# Patient Record
Sex: MALE | Race: WHITE | NOT HISPANIC OR LATINO
[De-identification: names, ages, dates, MRNs, and addresses within clinical notes are randomized per-mention and may not be internally consistent; named-entity substitution may affect disease eponyms.]

---

## 2021-08-12 PROBLEM — Z00.00 ENCOUNTER FOR PREVENTIVE HEALTH EXAMINATION: Status: ACTIVE | Noted: 2021-08-12

## 2021-09-01 ENCOUNTER — TRANSCRIPTION ENCOUNTER (OUTPATIENT)
Age: 61
End: 2021-09-01

## 2021-09-27 ENCOUNTER — NON-APPOINTMENT (OUTPATIENT)
Age: 61
End: 2021-09-27

## 2021-10-05 ENCOUNTER — APPOINTMENT (OUTPATIENT)
Dept: SURGERY | Facility: CLINIC | Age: 61
End: 2021-10-05
Payer: COMMERCIAL

## 2021-10-05 VITALS
HEART RATE: 71 BPM | DIASTOLIC BLOOD PRESSURE: 84 MMHG | WEIGHT: 180 LBS | SYSTOLIC BLOOD PRESSURE: 174 MMHG | HEIGHT: 68.5 IN | BODY MASS INDEX: 26.97 KG/M2

## 2021-10-05 DIAGNOSIS — Z86.79 PERSONAL HISTORY OF OTHER DISEASES OF THE CIRCULATORY SYSTEM: ICD-10-CM

## 2021-10-05 DIAGNOSIS — Z80.42 FAMILY HISTORY OF MALIGNANT NEOPLASM OF PROSTATE: ICD-10-CM

## 2021-10-05 DIAGNOSIS — Z86.39 PERSONAL HISTORY OF OTHER ENDOCRINE, NUTRITIONAL AND METABOLIC DISEASE: ICD-10-CM

## 2021-10-05 DIAGNOSIS — R59.9 ENLARGED LYMPH NODES, UNSPECIFIED: ICD-10-CM

## 2021-10-05 DIAGNOSIS — Z87.891 PERSONAL HISTORY OF NICOTINE DEPENDENCE: ICD-10-CM

## 2021-10-05 DIAGNOSIS — Z78.9 OTHER SPECIFIED HEALTH STATUS: ICD-10-CM

## 2021-10-05 PROCEDURE — 99204 OFFICE O/P NEW MOD 45 MIN: CPT

## 2021-10-05 RX ORDER — AMLODIPINE BESYLATE 5 MG/1
5 TABLET ORAL
Refills: 0 | Status: ACTIVE | COMMUNITY

## 2021-10-05 RX ORDER — ROSUVASTATIN CALCIUM 5 MG/1
5 TABLET, FILM COATED ORAL
Refills: 0 | Status: ACTIVE | COMMUNITY

## 2021-10-06 NOTE — PHYSICAL EXAM
[de-identified] : 1.2 cm left thyroid nodule, well circumscribed and mobile [Laryngoscopy Performed] : laryngoscopy was performed, see procedure section for findings [Midline] : located in midline position [Normal] : orientation to person, place, and time: normal [de-identified] : indirect  laryngoscopy shows normal vocal cord mobility bilaterally with no lesions noted

## 2021-10-06 NOTE — HISTORY OF PRESENT ILLNESS
[de-identified] : Pt c/o Thyroid nodule found on calcium score CT.  denies dysphagia, hoarseness, SOB or RT exposure\par sonogram:  left 1.6 cm thyroid nodule, FNA (CBL) suspicious for papillary carcinoma;   Left level 4 Lymph node  2.4 cm FNA (CBL) Benign and 1.0 cm adjacent lymph node\par normal TSH\par I have reviewed all old and new data and available images.  Additional information was obtained from others present at the time of visit to ensure the completeness of the history\par

## 2021-10-06 NOTE — CONSULT LETTER
[Dear  ___] : Dear  [unfilled], [Consult Letter:] : I had the pleasure of evaluating your patient, [unfilled]. [Please see my note below.] : Please see my note below. [Consult Closing:] : Thank you very much for allowing me to participate in the care of this patient.  If you have any questions, please do not hesitate to contact me. [Sincerely,] : Sincerely, [FreeTextEntry2] : Dr. Alexx Saravia, Dr. Jayce Moctezuma [FreeTextEntry3] : Andre Guo MD, FACS\par System Director, Endocrine Surgery\par [facility]\par Assistant Clinical Professor of Surgery\par [facility] [employer] at [facility]  [DrDania  ___] : Dr. DEL TORO

## 2021-10-06 NOTE — ASSESSMENT
[FreeTextEntry1] : lengthy discussion regarding options for management including active surveillance  vs thyroid lobectomy with possible paratracheal node dissection, with or without frozen section vs total thyroidectomy with possible paratracheal node dissection.  risks, benefits and alternatives discussed at length.  I have discussed with the patient the anatomy of the area, the pathophysiology of the disease process and the rationale for surgery.  The attendant risks, possible complications and expected postoperative course have been discussed in detail.  I have given the patient the opportunity to ask questions, and all questions have been answered to the patient's satisfaction, and they wish to proceed with the planned procedure.  to schedule lobectomy, possible total thyroidectomy with paratracheal node dissection pending outcome of requested repeat sonogram guided FNA rounded neck node to r/o malignancy, including specimen to be sent for thyroglobulin.  to call next week for results. \par

## 2021-10-13 ENCOUNTER — RESULT REVIEW (OUTPATIENT)
Age: 61
End: 2021-10-13

## 2021-10-20 ENCOUNTER — RESULT REVIEW (OUTPATIENT)
Age: 61
End: 2021-10-20

## 2021-10-20 ENCOUNTER — OUTPATIENT (OUTPATIENT)
Dept: OUTPATIENT SERVICES | Facility: HOSPITAL | Age: 61
LOS: 1 days | End: 2021-10-20
Payer: COMMERCIAL

## 2021-10-20 ENCOUNTER — APPOINTMENT (OUTPATIENT)
Dept: ULTRASOUND IMAGING | Facility: IMAGING CENTER | Age: 61
End: 2021-10-20
Payer: COMMERCIAL

## 2021-10-20 DIAGNOSIS — R59.9 ENLARGED LYMPH NODES, UNSPECIFIED: ICD-10-CM

## 2021-10-20 DIAGNOSIS — Z00.8 ENCOUNTER FOR OTHER GENERAL EXAMINATION: ICD-10-CM

## 2021-10-20 PROCEDURE — 10006 FNA BX W/US GDN EA ADDL: CPT

## 2021-10-20 PROCEDURE — 88173 CYTOPATH EVAL FNA REPORT: CPT | Mod: 26

## 2021-10-20 PROCEDURE — 88305 TISSUE EXAM BY PATHOLOGIST: CPT

## 2021-10-20 PROCEDURE — 10005 FNA BX W/US GDN 1ST LES: CPT

## 2021-10-20 PROCEDURE — 88173 CYTOPATH EVAL FNA REPORT: CPT

## 2021-10-20 PROCEDURE — 88172 CYTP DX EVAL FNA 1ST EA SITE: CPT

## 2021-10-20 PROCEDURE — 88305 TISSUE EXAM BY PATHOLOGIST: CPT | Mod: 26

## 2021-10-21 LAB
NON-GYNECOLOGICAL CYTOLOGY STUDY: SIGNIFICANT CHANGE UP
NON-GYNECOLOGICAL CYTOLOGY STUDY: SIGNIFICANT CHANGE UP

## 2021-10-26 ENCOUNTER — NON-APPOINTMENT (OUTPATIENT)
Age: 61
End: 2021-10-26

## 2021-11-04 ENCOUNTER — OUTPATIENT (OUTPATIENT)
Dept: OUTPATIENT SERVICES | Facility: HOSPITAL | Age: 61
LOS: 1 days | End: 2021-11-04
Payer: COMMERCIAL

## 2021-11-04 VITALS
SYSTOLIC BLOOD PRESSURE: 144 MMHG | RESPIRATION RATE: 18 BRPM | OXYGEN SATURATION: 98 % | DIASTOLIC BLOOD PRESSURE: 90 MMHG | HEIGHT: 68 IN | TEMPERATURE: 98 F | WEIGHT: 182.1 LBS | HEART RATE: 76 BPM

## 2021-11-04 DIAGNOSIS — Z98.890 OTHER SPECIFIED POSTPROCEDURAL STATES: Chronic | ICD-10-CM

## 2021-11-04 DIAGNOSIS — C73 MALIGNANT NEOPLASM OF THYROID GLAND: ICD-10-CM

## 2021-11-04 DIAGNOSIS — E04.1 NONTOXIC SINGLE THYROID NODULE: ICD-10-CM

## 2021-11-04 DIAGNOSIS — I10 ESSENTIAL (PRIMARY) HYPERTENSION: ICD-10-CM

## 2021-11-04 LAB
ANION GAP SERPL CALC-SCNC: 11 MMOL/L — SIGNIFICANT CHANGE UP (ref 7–14)
BLD GP AB SCN SERPL QL: NEGATIVE — SIGNIFICANT CHANGE UP
BUN SERPL-MCNC: 10 MG/DL — SIGNIFICANT CHANGE UP (ref 7–23)
CALCIUM SERPL-MCNC: 9.6 MG/DL — SIGNIFICANT CHANGE UP (ref 8.4–10.5)
CHLORIDE SERPL-SCNC: 102 MMOL/L — SIGNIFICANT CHANGE UP (ref 98–107)
CO2 SERPL-SCNC: 28 MMOL/L — SIGNIFICANT CHANGE UP (ref 22–31)
CREAT SERPL-MCNC: 0.92 MG/DL — SIGNIFICANT CHANGE UP (ref 0.5–1.3)
GLUCOSE SERPL-MCNC: 101 MG/DL — HIGH (ref 70–99)
HCT VFR BLD CALC: 48 % — SIGNIFICANT CHANGE UP (ref 39–50)
HGB BLD-MCNC: 15.8 G/DL — SIGNIFICANT CHANGE UP (ref 13–17)
MCHC RBC-ENTMCNC: 29.7 PG — SIGNIFICANT CHANGE UP (ref 27–34)
MCHC RBC-ENTMCNC: 32.9 GM/DL — SIGNIFICANT CHANGE UP (ref 32–36)
MCV RBC AUTO: 90.2 FL — SIGNIFICANT CHANGE UP (ref 80–100)
NRBC # BLD: 0 /100 WBCS — SIGNIFICANT CHANGE UP
NRBC # FLD: 0 K/UL — SIGNIFICANT CHANGE UP
PLATELET # BLD AUTO: 96 K/UL — LOW (ref 150–400)
POTASSIUM SERPL-MCNC: 5.1 MMOL/L — SIGNIFICANT CHANGE UP (ref 3.5–5.3)
POTASSIUM SERPL-SCNC: 5.1 MMOL/L — SIGNIFICANT CHANGE UP (ref 3.5–5.3)
RBC # BLD: 5.32 M/UL — SIGNIFICANT CHANGE UP (ref 4.2–5.8)
RBC # FLD: 12.3 % — SIGNIFICANT CHANGE UP (ref 10.3–14.5)
RH IG SCN BLD-IMP: POSITIVE — SIGNIFICANT CHANGE UP
SODIUM SERPL-SCNC: 141 MMOL/L — SIGNIFICANT CHANGE UP (ref 135–145)
WBC # BLD: 5.93 K/UL — SIGNIFICANT CHANGE UP (ref 3.8–10.5)
WBC # FLD AUTO: 5.93 K/UL — SIGNIFICANT CHANGE UP (ref 3.8–10.5)

## 2021-11-04 PROCEDURE — 93010 ELECTROCARDIOGRAM REPORT: CPT

## 2021-11-04 NOTE — H&P PST ADULT - HISTORY OF PRESENT ILLNESS
60 y/o male with hx of thyroid nodule discovered on recent imaging, malignant on biopsy. Now scheduled forTotal Thyroidectomy, Left neck dissection

## 2021-11-04 NOTE — H&P PST ADULT - NSANTHOSAYNRD_GEN_A_CORE
No. OMER screening performed.  STOP BANG Legend: 0-2 = LOW Risk; 3-4 = INTERMEDIATE Risk; 5-8 = HIGH Risk

## 2021-11-04 NOTE — H&P PST ADULT - ASSESSMENT
62 y/o male with hx of thyroid nodule discovered on recent imaging, malignant on biopsy. Now scheduled forTotal Thyroidectomy, Left neck dissection

## 2021-11-04 NOTE — H&P PST ADULT - VENOUS THROMBOEMBOLISM AGE
Pt arrives from home c/o chest pain 7/7  with \"pins\" in R arm since 30 min PTA. +SOB +nausea. Denies nausea. Reports abnormal EKG on previous visit earlier this week.     Hx asthma    Rotator cuff pain R side.   
(2) 61-74 years

## 2021-11-04 NOTE — H&P PST ADULT - NSICDXFAMILYHX_GEN_ALL_CORE_FT
FAMILY HISTORY:  Father  Still living? No  FH: prostate cancer, Age at diagnosis: Age Unknown    Mother  Still living? Yes, Estimated age: Age Unknown  FHx: colon cancer, Age at diagnosis: Age Unknown

## 2021-11-04 NOTE — H&P PST ADULT - NSICDXPASTMEDICALHX_GEN_ALL_CORE_FT
PAST MEDICAL HISTORY:  Hypertension      PAST MEDICAL HISTORY:  Hyperlipidemia     Hypertension

## 2021-11-04 NOTE — H&P PST ADULT - ENDOCRINE COMMENTS
thyroid nodule, malignant on biopsy per pt. Pt reprots TFT's have been WNL thyroid nodule, malignant on biopsy per pt. Pt reports TFT's have been WNL

## 2021-11-04 NOTE — H&P PST ADULT - PROBLEM SELECTOR PLAN 2
Pt advised to take his amlodipine morning of surgery with sip of water.  Pt reports he has appt with PMD for pre-op eval (/90 at [facility])

## 2021-11-04 NOTE — H&P PST ADULT - PROBLEM SELECTOR PLAN 1
Total Thyroidectomy, Left neck dissection    CBC BMP EKG T&S    Preop instructions and antibacterial soap given and explained (verbal and written), with teach back. Total Thyroidectomy, Left neck dissection    CBC BMP EKG T&S    Preop instructions and antibacterial soap given and explained (verbal and written), with teach back.    OMER precautions per Stop Bang Questionnaire tool

## 2021-11-04 NOTE — H&P PST ADULT - NSICDXPASTSURGICALHX_GEN_ALL_CORE_FT
PAST SURGICAL HISTORY:  H/O excision of hemangioma 1990, 2000 (benign)  1990's    H/O inguinal hernia repair 1968 (R) 2004 (L)

## 2021-11-05 ENCOUNTER — OUTPATIENT (OUTPATIENT)
Dept: OUTPATIENT SERVICES | Facility: HOSPITAL | Age: 61
LOS: 1 days | End: 2021-11-05

## 2021-11-05 DIAGNOSIS — Z98.890 OTHER SPECIFIED POSTPROCEDURAL STATES: Chronic | ICD-10-CM

## 2021-11-05 DIAGNOSIS — C73 MALIGNANT NEOPLASM OF THYROID GLAND: ICD-10-CM

## 2021-11-05 PROBLEM — I10 ESSENTIAL (PRIMARY) HYPERTENSION: Chronic | Status: ACTIVE | Noted: 2021-11-04

## 2021-11-05 PROBLEM — E78.5 HYPERLIPIDEMIA, UNSPECIFIED: Chronic | Status: ACTIVE | Noted: 2021-11-04

## 2021-11-05 LAB — CLOSURE TME COLL+EPINEP BLD: 146 K/UL — LOW (ref 150–400)

## 2021-11-13 DIAGNOSIS — Z01.818 ENCOUNTER FOR OTHER PREPROCEDURAL EXAMINATION: ICD-10-CM

## 2021-11-14 ENCOUNTER — APPOINTMENT (OUTPATIENT)
Dept: DISASTER EMERGENCY | Facility: CLINIC | Age: 61
End: 2021-11-14

## 2021-11-15 LAB — SARS-COV-2 N GENE NPH QL NAA+PROBE: NOT DETECTED

## 2021-11-16 NOTE — ASU PATIENT PROFILE, ADULT - AS SC BRADEN NUTRITION
Also ASAP MRI Brain    Orders Placed This Encounter   • MRI BRAIN WO CONTRAST   • Valproic Acid   • Oxcarbazepine Level   • SERVICE TO NEUROLOGY   • OXcarbazepine (TRILEPTAL) 300 MG tablet        (3) adequate

## 2021-11-17 ENCOUNTER — RESULT REVIEW (OUTPATIENT)
Age: 61
End: 2021-11-17

## 2021-11-17 ENCOUNTER — INPATIENT (INPATIENT)
Facility: HOSPITAL | Age: 61
LOS: 0 days | Discharge: HOME | End: 2021-11-18
Attending: SPECIALIST | Admitting: SPECIALIST
Payer: COMMERCIAL

## 2021-11-17 ENCOUNTER — TRANSCRIPTION ENCOUNTER (OUTPATIENT)
Age: 61
End: 2021-11-17

## 2021-11-17 ENCOUNTER — APPOINTMENT (OUTPATIENT)
Dept: SURGERY | Facility: HOSPITAL | Age: 61
End: 2021-11-17

## 2021-11-17 VITALS
OXYGEN SATURATION: 100 % | HEART RATE: 79 BPM | HEIGHT: 68.5 IN | SYSTOLIC BLOOD PRESSURE: 152 MMHG | DIASTOLIC BLOOD PRESSURE: 66 MMHG | RESPIRATION RATE: 18 BRPM | WEIGHT: 177.91 LBS | TEMPERATURE: 98 F

## 2021-11-17 DIAGNOSIS — C73 MALIGNANT NEOPLASM OF THYROID GLAND: ICD-10-CM

## 2021-11-17 DIAGNOSIS — Z98.890 OTHER SPECIFIED POSTPROCEDURAL STATES: Chronic | ICD-10-CM

## 2021-11-17 LAB
CALCIUM SERPL-MCNC: 10.1 MG/DL — SIGNIFICANT CHANGE UP (ref 8.4–10.5)
CALCIUM SERPL-MCNC: 9.3 MG/DL — SIGNIFICANT CHANGE UP (ref 8.4–10.5)
RH IG SCN BLD-IMP: POSITIVE — SIGNIFICANT CHANGE UP

## 2021-11-17 PROCEDURE — 13132 CMPLX RPR F/C/C/M/N/AX/G/H/F: CPT | Mod: 59

## 2021-11-17 PROCEDURE — 88360 TUMOR IMMUNOHISTOCHEM/MANUAL: CPT | Mod: 26

## 2021-11-17 PROCEDURE — 13133 CMPLX RPR F/C/C/M/N/AX/G/H/F: CPT | Mod: 59

## 2021-11-17 PROCEDURE — 60254 EXTENSIVE THYROID SURGERY: CPT

## 2021-11-17 PROCEDURE — 88307 TISSUE EXAM BY PATHOLOGIST: CPT | Mod: 26

## 2021-11-17 PROCEDURE — 88342 IMHCHEM/IMCYTCHM 1ST ANTB: CPT | Mod: 26,59

## 2021-11-17 PROCEDURE — 88341 IMHCHEM/IMCYTCHM EA ADD ANTB: CPT | Mod: 26,59

## 2021-11-17 RX ORDER — HYDROMORPHONE HYDROCHLORIDE 2 MG/ML
0.5 INJECTION INTRAMUSCULAR; INTRAVENOUS; SUBCUTANEOUS
Refills: 0 | Status: DISCONTINUED | OUTPATIENT
Start: 2021-11-17 | End: 2021-11-17

## 2021-11-17 RX ORDER — OXYCODONE AND ACETAMINOPHEN 5; 325 MG/1; MG/1
1 TABLET ORAL EVERY 6 HOURS
Refills: 0 | Status: DISCONTINUED | OUTPATIENT
Start: 2021-11-17 | End: 2021-11-18

## 2021-11-17 RX ORDER — ACETAMINOPHEN 500 MG
2 TABLET ORAL
Qty: 0 | Refills: 0 | DISCHARGE
Start: 2021-11-17

## 2021-11-17 RX ORDER — SODIUM CHLORIDE 9 MG/ML
1000 INJECTION, SOLUTION INTRAVENOUS
Refills: 0 | Status: DISCONTINUED | OUTPATIENT
Start: 2021-11-17 | End: 2021-11-18

## 2021-11-17 RX ORDER — ROSUVASTATIN CALCIUM 5 MG/1
1 TABLET ORAL
Qty: 0 | Refills: 0 | DISCHARGE

## 2021-11-17 RX ORDER — ATORVASTATIN CALCIUM 80 MG/1
20 TABLET, FILM COATED ORAL AT BEDTIME
Refills: 0 | Status: DISCONTINUED | OUTPATIENT
Start: 2021-11-17 | End: 2021-11-18

## 2021-11-17 RX ORDER — AMLODIPINE BESYLATE 2.5 MG/1
1 TABLET ORAL
Qty: 0 | Refills: 0 | DISCHARGE

## 2021-11-17 RX ORDER — CALCIUM CARBONATE 500(1250)
1 TABLET ORAL
Qty: 0 | Refills: 0 | DISCHARGE
Start: 2021-11-17

## 2021-11-17 RX ORDER — OXYCODONE HYDROCHLORIDE 5 MG/1
5 TABLET ORAL ONCE
Refills: 0 | Status: DISCONTINUED | OUTPATIENT
Start: 2021-11-17 | End: 2021-11-17

## 2021-11-17 RX ORDER — BENZOCAINE AND MENTHOL 5; 1 G/100ML; G/100ML
1 LIQUID ORAL
Refills: 0 | Status: DISCONTINUED | OUTPATIENT
Start: 2021-11-17 | End: 2021-11-17

## 2021-11-17 RX ORDER — BENZOCAINE AND MENTHOL 5; 1 G/100ML; G/100ML
1 LIQUID ORAL
Refills: 0 | Status: DISCONTINUED | OUTPATIENT
Start: 2021-11-17 | End: 2021-11-18

## 2021-11-17 RX ORDER — LEVOTHYROXINE SODIUM 125 MCG
100 TABLET ORAL DAILY
Refills: 0 | Status: DISCONTINUED | OUTPATIENT
Start: 2021-11-17 | End: 2021-11-18

## 2021-11-17 RX ORDER — CALCIUM CARBONATE 500(1250)
1 TABLET ORAL EVERY 6 HOURS
Refills: 0 | Status: DISCONTINUED | OUTPATIENT
Start: 2021-11-17 | End: 2021-11-18

## 2021-11-17 RX ORDER — CALCIUM GLUCONATE 100 MG/ML
1 VIAL (ML) INTRAVENOUS ONCE
Refills: 0 | Status: COMPLETED | OUTPATIENT
Start: 2021-11-17 | End: 2021-11-17

## 2021-11-17 RX ORDER — LEVOTHYROXINE SODIUM 125 MCG
1 TABLET ORAL
Qty: 0 | Refills: 0 | DISCHARGE
Start: 2021-11-17

## 2021-11-17 RX ORDER — ACETAMINOPHEN 500 MG
650 TABLET ORAL EVERY 6 HOURS
Refills: 0 | Status: DISCONTINUED | OUTPATIENT
Start: 2021-11-17 | End: 2021-11-18

## 2021-11-17 RX ORDER — AMLODIPINE BESYLATE 2.5 MG/1
5 TABLET ORAL DAILY
Refills: 0 | Status: DISCONTINUED | OUTPATIENT
Start: 2021-11-17 | End: 2021-11-18

## 2021-11-17 RX ADMIN — Medication 1 TABLET(S): at 23:45

## 2021-11-17 RX ADMIN — Medication 100 GRAM(S): at 18:17

## 2021-11-17 RX ADMIN — SODIUM CHLORIDE 50 MILLILITER(S): 9 INJECTION, SOLUTION INTRAVENOUS at 18:16

## 2021-11-17 RX ADMIN — Medication 1 TABLET(S): at 18:17

## 2021-11-17 RX ADMIN — ATORVASTATIN CALCIUM 20 MILLIGRAM(S): 80 TABLET, FILM COATED ORAL at 21:53

## 2021-11-17 NOTE — CHART NOTE - NSCHARTNOTEFT_GEN_A_CORE
Post Operative Note  Patient: BRONWYN LAWLER 61y (1960) Male   MRN: [id number]  Location: [place] [id number] A  Visit: 11-17-21 Inpatient      Procedure: S/P total thyroidectomy, left modified radical neck dissection    Subjective:       Objective:  Vitals: T(F): 99 (11-17-21 @ 22:25), Max: 99 (11-17-21 @ 22:25)  HR: 89 (11-17-21 @ 22:25)  BP: 144/79 (11-17-21 @ 22:25) (128/64 - 159/73)  RR: 18 (11-17-21 @ 22:25)  SpO2: 99% (11-17-21 @ 22:25)  Vent Settings:     In:   11-17-21 @ 07:01  -  11-18-21 @ 01:20  --------------------------------------------------------  IN: 1210 mL      IV Fluids: lactated ringers. 1000 milliLiter(s) (50 mL/Hr) IV Continuous <Continuous>      Out:   11-17-21 @ 07:01  -  11-18-21 @ 01:20  --------------------------------------------------------  OUT: 672.5 mL      EBL:     Voided Urine:   11-17-21 @ 07:01  -  11-18-21 @ 01:20  --------------------------------------------------------  OUT: 672.5 mL      Gomez Catheter: yes no   Drains:   HUBER:   11-17-21 @ 07:01  -  11-18-21 @ 01:20  --------------------------------------------------------  OUT: 72.5 mL     ,   Chest Tube:      NG Tube:         Physical Examination:  General: NAD, resting comfortably in bed  HEENT: Normocephalic atraumatic  Respiratory: Nonlabored respirations, normal CW expansion.  Cardio: S1S2, regular rate and rhythm.  Abdomen: softly distended, appropriately tender, surgical incisions are c/d/i. NGT/OGT*  Vascular: extremities are warm and well perfused.     Imaging:  No post-op imaging studies    Assessment:  61yMale patient S/P total thyroidectomy, left modified radical neck dissection     Plan:  - IV Abx:   - Pain control PRN  - Diet:  - Activity:  - DVT ppx:    Date/Time: 11-18-21 @ 01:20 Post Operative Note  Patient: BRONWYN LAWLER 61y (1960) Male   MRN: [id number]  Location: [place] [address] A  Visit: 11-17-21 Inpatient      Procedure: S/P total thyroidectomy, left modified radical neck dissection    Subjective:   Seen and examined 4 hrs postop. No acute events in the immediate postop period. Denies chest pain, SOB, nausea or vomiting. Tolerating a diet.     Objective:  Vitals: T(F): 99 (11-17-21 @ 22:25), Max: 99 (11-17-21 @ 22:25)  HR: 89 (11-17-21 @ 22:25)  BP: 144/79 (11-17-21 @ 22:25) (128/64 - 159/73)  RR: 18 (11-17-21 @ 22:25)  SpO2: 99% (11-17-21 @ 22:25)  Vent Settings:     In:   11-17-21 @ 07:01  -  11-18-21 @ 01:20  --------------------------------------------------------  IN: 1210 mL      IV Fluids: lactated ringers. 1000 milliLiter(s) (50 mL/Hr) IV Continuous <Continuous>      Out:   11-17-21 @ 07:01  -  11-18-21 @ 01:20  --------------------------------------------------------  OUT: 672.5 mL      EBL:     Voided Urine:   11-17-21 @ 07:01  -  11-18-21 @ 01:20  --------------------------------------------------------  OUT: 672.5 mL      Gomez Catheter: no   Drains:   HUBER:   11-17-21 @ 07:01  -  11-18-21 @ 01:20  --------------------------------------------------------  OUT: 72.5 mL        Physical Examination:  General: NAD, resting comfortably in bed  HEENT: Normocephalic atraumatic  Respiratory: Nonlabored respirations, normal CW expansion.  Cardio:  regular rate and rhythm.  Neck: soft, nontender, nondistended. incision c/d/i. JPx1 w/ sanguinous output   Imaging:  No post-op imaging studies    Assessment:  61yMale patient S/P total thyroidectomy, left modified radical neck dissection     Plan:  - Pain control PRN  - Diet: ADAT  - Activity: as tolerated   - Monitor HUBER output     C Team Surgery  [id number]

## 2021-11-17 NOTE — DISCHARGE NOTE PROVIDER - NSDCCPTREATMENT_GEN_ALL_CORE_FT
PRINCIPAL PROCEDURE  Procedure: Total thyroidectomy, left radical neck dissection  Findings and Treatment:

## 2021-11-17 NOTE — DISCHARGE NOTE PROVIDER - CARE PROVIDER_API CALL
Andre Gou)  Plastic Surgery  [address] [place] [address] [address]  [place] [address]  Phone: [phone]  Fax: [phone]  Follow Up Time:

## 2021-11-17 NOTE — DISCHARGE NOTE PROVIDER - NSDCMRMEDTOKEN_GEN_ALL_CORE_FT
acetaminophen 325 mg oral tablet: 2 tab(s) orally every 6 hours, As needed, Moderate Pain (4 - 6)  amLODIPine 5 mg oral tablet: 1 tab(s) orally once a day  calcium carbonate 500 mg (200 mg elemental calcium) oral tablet, chewable: 1 tab(s) orally every 6 hours  levothyroxine 100 mcg (0.1 mg) oral tablet: 1 tab(s) orally once a day  rosuvastatin 5 mg oral tablet: 1 tab(s) orally once a day

## 2021-11-18 ENCOUNTER — TRANSCRIPTION ENCOUNTER (OUTPATIENT)
Age: 61
End: 2021-11-18

## 2021-11-18 VITALS
SYSTOLIC BLOOD PRESSURE: 141 MMHG | DIASTOLIC BLOOD PRESSURE: 76 MMHG | TEMPERATURE: 98 F | RESPIRATION RATE: 17 BRPM | HEART RATE: 82 BPM | OXYGEN SATURATION: 96 %

## 2021-11-18 RX ADMIN — Medication 100 MICROGRAM(S): at 06:26

## 2021-11-18 RX ADMIN — BENZOCAINE AND MENTHOL 1 LOZENGE: 5; 1 LIQUID ORAL at 01:37

## 2021-11-18 RX ADMIN — Medication 1 TABLET(S): at 06:22

## 2021-11-18 RX ADMIN — Medication 1 TABLET(S): at 11:08

## 2021-11-18 RX ADMIN — AMLODIPINE BESYLATE 5 MILLIGRAM(S): 2.5 TABLET ORAL at 06:26

## 2021-11-18 NOTE — DISCHARGE NOTE NURSING/CASE MANAGEMENT/SOCIAL WORK - PATIENT PORTAL LINK FT
You can access the FollowMyHealth Patient Portal offered by [facility] by registering at the following website: [email or web address]. By joining [employer]’s FollowMyHealth portal, you will also be able to view your health information using other applications (apps) compatible with our system.

## 2021-11-18 NOTE — DISCHARGE NOTE NURSING/CASE MANAGEMENT/SOCIAL WORK - NSDCPNINST_GEN_ALL_CORE
Keep incisions clean and dry, notify provider if fever greater than 100.4, redness or swelling at site, or pain unrelieved by medication.

## 2021-11-18 NOTE — PROGRESS NOTE ADULT - ASSESSMENT
Assessment:  61yMale POD#1 S/P total thyroidectomy, left modified radical neck dissection     Plan:  - IV Abx:   - Pain control PRN  - Diet: ADAT  - Activity: as tolerated   - Monitor HUBER output  - Dispo planning     C Team Surgery  [id number]

## 2021-11-18 NOTE — PROGRESS NOTE ADULT - SUBJECTIVE AND OBJECTIVE BOX
1 day s/p total thyroidectomy and neck dissection. afebrile. no collections. now taking adequate po. calcium level stable. negative chvostek's sign.  discharge home with HUBER.  Rx for synthroid sent to pharmacy.  f/u in office
      SUBJECTIVE:   Seen and examined at bedside. No acute events overnight. Ca levels WNL immediately postop.     OBJECTIVE: T(C): 36.9 (11-18-21 @ 01:58), Max: 37.2 (11-17-21 @ 22:25)  HR: 76 (11-18-21 @ 01:58) (76 - 104)  BP: 129/71 (11-18-21 @ 01:58) (128/64 - 159/73)  RR: 18 (11-18-21 @ 01:58) (9 - 20)  SpO2: 99% (11-18-21 @ 01:58) (92% - 100%)  Wt(kg): --  I&O's Summary    17 Nov 2021 07:01  -  18 Nov 2021 03:37  --------------------------------------------------------  IN: 1210 mL / OUT: 672.5 mL / NET: 537.5 mL      I&O's Detail    17 Nov 2021 07:01  -  18 Nov 2021 03:37  --------------------------------------------------------  IN:    Lactated Ringers: 450 mL    Oral Fluid: 760 mL  Total IN: 1210 mL    OUT:    Bulb (mL): 72.5 mL    IV PiggyBack: 0 mL    Voided (mL): 600 mL  Total OUT: 672.5 mL    Total NET: 537.5 mL        Physical Examination:  General: NAD, resting comfortably in bed  HEENT: Normocephalic atraumatic  Respiratory: Nonlabored respirations, normal CW expansion.  Cardio: RRR  Neck: soft, nontender, nondistended. incision c/d/i. JPx1 w/ minimal sanguinous output     MEDICATIONS  (STANDING):  amLODIPine   Tablet 5 milliGRAM(s) Oral daily  atorvastatin 20 milliGRAM(s) Oral at bedtime  calcium carbonate    500 mG (Tums) Chewable 1 Tablet(s) Chew every 6 hours  lactated ringers. 1000 milliLiter(s) (50 mL/Hr) IV Continuous <Continuous>  levothyroxine 100 MICROGram(s) Oral daily    MEDICATIONS  (PRN):  acetaminophen     Tablet .. 650 milliGRAM(s) Oral every 6 hours PRN Moderate Pain (4 - 6)  benzocaine 15 mG/menthol 3.6 mG Lozenge 1 Lozenge Oral every 2 hours PRN Sore Throat  oxycodone    5 mG/acetaminophen 325 mG 1 Tablet(s) Oral every 6 hours PRN Severe Pain (7 - 10)      LABS:      Ca    9.3      17 Nov 2021 22:39

## 2021-11-23 ENCOUNTER — APPOINTMENT (OUTPATIENT)
Dept: SURGERY | Facility: CLINIC | Age: 61
End: 2021-11-23
Payer: COMMERCIAL

## 2021-11-23 LAB — SURGICAL PATHOLOGY STUDY: SIGNIFICANT CHANGE UP

## 2021-11-23 PROCEDURE — 99024 POSTOP FOLLOW-UP VISIT: CPT

## 2021-11-23 NOTE — ASSESSMENT
[FreeTextEntry1] : drain removed. instructed in maneuvers to minimize scarring. to continue Synthroid. to see dr isaac 2 weeks. to call next week for final pathology report. patient has been given the opportunity to ask questions, and all of the patient's questions have been answered to their satisfaction.  to return earlier if any change

## 2021-11-23 NOTE — PHYSICAL EXAM
[de-identified] : minimal operative site swelling. no palpable masses [Midline] : located in midline position [Normal] : orientation to person, place, and time: normal [de-identified] : negative  Chvostek's sign

## 2021-11-28 ENCOUNTER — APPOINTMENT (OUTPATIENT)
Dept: DISASTER EMERGENCY | Facility: CLINIC | Age: 61
End: 2021-11-28

## 2021-11-29 ENCOUNTER — NON-APPOINTMENT (OUTPATIENT)
Age: 61
End: 2021-11-29

## 2021-11-30 ENCOUNTER — TRANSCRIPTION ENCOUNTER (OUTPATIENT)
Age: 61
End: 2021-11-30

## 2021-12-21 ENCOUNTER — APPOINTMENT (OUTPATIENT)
Dept: SURGERY | Facility: CLINIC | Age: 61
End: 2021-12-21
Payer: COMMERCIAL

## 2021-12-21 PROCEDURE — 99024 POSTOP FOLLOW-UP VISIT: CPT

## 2021-12-21 NOTE — ASSESSMENT
[FreeTextEntry1] : s/p Thyroidectomy and left neck dissection\par daily care\par Fu Dr Moctezuma\par f/u 4 months

## 2021-12-21 NOTE — HISTORY OF PRESENT ILLNESS
[de-identified] : Pt 1 month s/p Total thyroidectomy and left neck dissection doing well has seen Dr Moctezuma and will do RESENDIZ after new year

## 2021-12-21 NOTE — PHYSICAL EXAM
[de-identified] : well healed scar [Midline] : located in midline position [Normal] : orientation to person, place, and time: normal

## 2022-02-14 ENCOUNTER — OUTPATIENT (OUTPATIENT)
Dept: OUTPATIENT SERVICES | Facility: HOSPITAL | Age: 62
LOS: 1 days | End: 2022-02-14
Payer: COMMERCIAL

## 2022-02-14 ENCOUNTER — APPOINTMENT (OUTPATIENT)
Dept: NUCLEAR MEDICINE | Facility: HOSPITAL | Age: 62
End: 2022-02-14
Payer: COMMERCIAL

## 2022-02-14 DIAGNOSIS — Z98.890 OTHER SPECIFIED POSTPROCEDURAL STATES: Chronic | ICD-10-CM

## 2022-02-14 DIAGNOSIS — C73 MALIGNANT NEOPLASM OF THYROID GLAND: ICD-10-CM

## 2022-02-14 PROCEDURE — 99202 OFFICE O/P NEW SF 15 MIN: CPT

## 2022-02-15 ENCOUNTER — APPOINTMENT (OUTPATIENT)
Dept: NUCLEAR MEDICINE | Facility: HOSPITAL | Age: 62
End: 2022-02-15

## 2022-02-16 ENCOUNTER — APPOINTMENT (OUTPATIENT)
Dept: NUCLEAR MEDICINE | Facility: HOSPITAL | Age: 62
End: 2022-02-16

## 2022-02-16 PROCEDURE — 79005 NUCLEAR RX ORAL ADMIN: CPT | Mod: 26

## 2022-02-22 ENCOUNTER — APPOINTMENT (OUTPATIENT)
Dept: NUCLEAR MEDICINE | Facility: HOSPITAL | Age: 62
End: 2022-02-22

## 2022-02-22 PROCEDURE — 96372 THER/PROPH/DIAG INJ SC/IM: CPT

## 2022-02-22 PROCEDURE — 78830 RP LOCLZJ TUM SPECT W/CT 1: CPT | Mod: 26

## 2022-02-22 PROCEDURE — 78018 THYROID MET IMAGING BODY: CPT | Mod: 26

## 2022-02-22 PROCEDURE — A9517: CPT

## 2022-02-22 PROCEDURE — 79005 NUCLEAR RX ORAL ADMIN: CPT

## 2022-02-22 PROCEDURE — 78830 RP LOCLZJ TUM SPECT W/CT 1: CPT

## 2022-02-22 PROCEDURE — 78018 THYROID MET IMAGING BODY: CPT

## 2022-04-14 ENCOUNTER — APPOINTMENT (OUTPATIENT)
Dept: SURGERY | Facility: CLINIC | Age: 62
End: 2022-04-14
Payer: COMMERCIAL

## 2022-04-14 PROCEDURE — 99213 OFFICE O/P EST LOW 20 MIN: CPT

## 2022-04-14 PROCEDURE — 36415 COLL VENOUS BLD VENIPUNCTURE: CPT

## 2022-04-14 RX ORDER — LEVOTHYROXINE SODIUM 0.1 MG/1
100 TABLET ORAL
Qty: 90 | Refills: 1 | Status: COMPLETED | COMMUNITY
Start: 2021-11-17 | End: 2022-04-14

## 2022-04-14 RX ORDER — LEVOTHYROXINE SODIUM 125 UG/1
125 TABLET ORAL
Refills: 0 | Status: ACTIVE | COMMUNITY

## 2022-04-14 NOTE — HISTORY OF PRESENT ILLNESS
[de-identified] : Pt 6 months s/p Thyroidectomy and left neck dissection doing well without complaints feels well on Synthroid 125 mcg from Dr Moctezuma

## 2022-04-14 NOTE — PHYSICAL EXAM
[de-identified] : well healed scar [Midline] : located in midline position [Normal] : orientation to person, place, and time: normal

## 2022-04-14 NOTE — ASSESSMENT
[FreeTextEntry1] : s/p thyroidectomy and left neck dissection\par daily care\par f/u Dr Moctezuma\par labs and scans per Dr Moctezuma\par f/u 6 months sooner if needed

## 2022-10-13 ENCOUNTER — APPOINTMENT (OUTPATIENT)
Dept: SURGERY | Facility: CLINIC | Age: 62
End: 2022-10-13

## 2022-10-13 PROCEDURE — 99213 OFFICE O/P EST LOW 20 MIN: CPT

## 2022-10-13 NOTE — HISTORY OF PRESENT ILLNESS
[de-identified] : Pt 11 months s/p Total thyroidectomy and left neck dissection doing well on synthroid 125 mcg  all recent reports reviewed

## 2022-10-13 NOTE — PHYSICAL EXAM
[de-identified] : well healed scar [Midline] : located in midline position [Normal] : orientation to person, place, and time: normal

## 2022-10-13 NOTE — ASSESSMENT
[FreeTextEntry1] : s/p total thyroidectomy and left Neck dissection\par Thyroid malignancy\par scar management \par labs and sans per DR Moctezuma\par f/u 6 months sooner if needed

## 2023-04-08 ENCOUNTER — NON-APPOINTMENT (OUTPATIENT)
Age: 63
End: 2023-04-08

## 2023-04-13 ENCOUNTER — APPOINTMENT (OUTPATIENT)
Dept: SURGERY | Facility: CLINIC | Age: 63
End: 2023-04-13
Payer: COMMERCIAL

## 2023-04-13 PROCEDURE — 99213 OFFICE O/P EST LOW 20 MIN: CPT

## 2023-04-13 NOTE — HISTORY OF PRESENT ILLNESS
[de-identified] : Pt 17 months s/p thyroidectomy and left neck dissection doing well without complaints will see Dr Espitia next month for sonogram and labs feels well on Synthroid 125 mcg daily all reports reviewed

## 2023-04-13 NOTE — PHYSICAL EXAM
[de-identified] : well healed scar [Normal] : external appearance is normal [Midline] : located in midline position

## 2023-04-13 NOTE — ASSESSMENT
[FreeTextEntry1] : Thyroid malignancy\par s/p thyroidectomy and left neck dissection\par labs and scans per Dr Espitia\par f/u 6 months sooner if needed

## 2023-10-12 ENCOUNTER — APPOINTMENT (OUTPATIENT)
Dept: SURGERY | Facility: CLINIC | Age: 63
End: 2023-10-12
Payer: COMMERCIAL

## 2023-10-12 PROCEDURE — 99213 OFFICE O/P EST LOW 20 MIN: CPT

## 2024-04-04 ENCOUNTER — APPOINTMENT (OUTPATIENT)
Dept: SURGERY | Facility: CLINIC | Age: 64
End: 2024-04-04
Payer: COMMERCIAL

## 2024-04-04 DIAGNOSIS — C73 MALIGNANT NEOPLASM OF THYROID GLAND: ICD-10-CM

## 2024-04-04 DIAGNOSIS — C77.0 SECONDARY AND UNSPECIFIED MALIGNANT NEOPLASM OF LYMPH NODES OF HEAD, FACE AND NECK: ICD-10-CM

## 2024-04-04 PROCEDURE — 99213 OFFICE O/P EST LOW 20 MIN: CPT

## 2024-04-04 NOTE — PHYSICAL EXAM
[de-identified] : well healed scar [Midline] : located in midline position [Normal] : orientation to person, place, and time: normal

## 2024-04-04 NOTE — HISTORY OF PRESENT ILLNESS
[de-identified] : Pt 2 1/2 years s/p thyroidectomy and neck dissection doing well on synthroid 125 mcg daily all old reports reviewed

## 2024-04-04 NOTE — ASSESSMENT
[FreeTextEntry1] : s/p Thyroidectomy and neck dissection daily care labs and scans per Dr Espitia f/u 6 months

## 2024-04-24 ENCOUNTER — TRANSCRIPTION ENCOUNTER (OUTPATIENT)
Age: 64
End: 2024-04-24

## 2024-10-01 ENCOUNTER — APPOINTMENT (OUTPATIENT)
Dept: SURGERY | Facility: CLINIC | Age: 64
End: 2024-10-01
Payer: COMMERCIAL

## 2024-10-01 DIAGNOSIS — C77.0 SECONDARY AND UNSPECIFIED MALIGNANT NEOPLASM OF LYMPH NODES OF HEAD, FACE AND NECK: ICD-10-CM

## 2024-10-01 PROCEDURE — 99213 OFFICE O/P EST LOW 20 MIN: CPT

## 2024-10-01 NOTE — PHYSICAL EXAM
[de-identified] : well healed scar [Midline] : located in midline position [Normal] : orientation to person, place, and time: normal

## 2024-10-01 NOTE — HISTORY OF PRESENT ILLNESS
[de-identified] : 3 years s/p thyroidectomy and left neck dissection for malignancy doing well without complaints. Pt states Dr isaac decreased synthroid to 6 days per week and TSH was 0.3.

## 2024-10-01 NOTE — ASSESSMENT
[FreeTextEntry1] : s/p thyroidectomy and neck dissection thyroid malignancy labs and scans per DR Moctezuma f/u 6 months sooner if needed

## 2025-03-29 ENCOUNTER — NON-APPOINTMENT (OUTPATIENT)
Age: 65
End: 2025-03-29

## 2025-04-01 ENCOUNTER — APPOINTMENT (OUTPATIENT)
Dept: SURGERY | Facility: CLINIC | Age: 65
End: 2025-04-01
Payer: COMMERCIAL

## 2025-04-01 DIAGNOSIS — C77.0 SECONDARY AND UNSPECIFIED MALIGNANT NEOPLASM OF LYMPH NODES OF HEAD, FACE AND NECK: ICD-10-CM

## 2025-04-01 DIAGNOSIS — C73 MALIGNANT NEOPLASM OF THYROID GLAND: ICD-10-CM

## 2025-04-01 PROCEDURE — 99213 OFFICE O/P EST LOW 20 MIN: CPT
